# Patient Record
Sex: FEMALE | Race: WHITE | Employment: FULL TIME | URBAN - METROPOLITAN AREA
[De-identification: names, ages, dates, MRNs, and addresses within clinical notes are randomized per-mention and may not be internally consistent; named-entity substitution may affect disease eponyms.]

---

## 2023-07-17 ENCOUNTER — OFFICE VISIT (OUTPATIENT)
Age: 43
End: 2023-07-17
Payer: COMMERCIAL

## 2023-07-17 VITALS — HEIGHT: 70 IN | BODY MASS INDEX: 3.15 KG/M2 | WEIGHT: 22 LBS

## 2023-07-17 DIAGNOSIS — Z91.89 AT HIGH RISK FOR BREAST CANCER: Primary | ICD-10-CM

## 2023-07-17 PROCEDURE — 1036F TOBACCO NON-USER: CPT | Performed by: SURGERY

## 2023-07-17 PROCEDURE — G8419 CALC BMI OUT NRM PARAM NOF/U: HCPCS | Performed by: SURGERY

## 2023-07-17 PROCEDURE — 99203 OFFICE O/P NEW LOW 30 MIN: CPT | Performed by: SURGERY

## 2023-07-17 PROCEDURE — G8427 DOCREV CUR MEDS BY ELIG CLIN: HCPCS | Performed by: SURGERY

## 2023-07-17 NOTE — PROGRESS NOTES
HISTORY OF PRESENT ILLNESS  Terry Martins is a 44 y.o. female     HPI NEW patient consult referred by Dr. Josue Morrell for discussion on getting bilateral mastectomies due to family history. Negative for BRCA gene. Patient has history of breast density and multiple cysts. No biopsies      Family History: Mother-breast cancer-60's  Sister-30's dx bl mastectomy   40's breast cancer metastasized to bones. My risk score 61%  TC 37%    Mammogram, 23, BIRADS 2  [facility]    Past Medical History:   Diagnosis Date    History of miscarriage     Thyroid disease      Past Surgical History:   Procedure Laterality Date    HEENT      TEETH EXTRACTION    ORTHOPEDIC SURGERY Right 2019    removal of cartilage    SALPINGO-OOPHORECTOMY Right      Social History     Socioeconomic History    Marital status: Single     Spouse name: Not on file    Number of children: Not on file    Years of education: Not on file    Highest education level: Not on file   Occupational History    Not on file   Tobacco Use    Smoking status: Never    Smokeless tobacco: Never   Substance and Sexual Activity    Alcohol use:  Yes     Alcohol/week: 2.0 standard drinks    Drug use: Never    Sexual activity: Not on file   Other Topics Concern    Not on file   Social History Narrative    Not on file     Social Determinants of Health     Financial Resource Strain: Not on file   Food Insecurity: Not on file   Transportation Needs: Not on file   Physical Activity: Not on file   Stress: Not on file   Social Connections: Not on file   Intimate Partner Violence: Not on file   Housing Stability: Not on file     OB History          1    Para        Term                AB        Living             SAB        IAB        Ectopic        Molar        Multiple        Live Births              Obstetric Comments   Menarche 16, LMP 24, # of children 1, age of 5st delivery 29, Hysterectomy/oophorectomy no/no, Breast bx no,

## 2023-08-25 ENCOUNTER — TELEPHONE (OUTPATIENT)
Age: 43
End: 2023-08-25